# Patient Record
(demographics unavailable — no encounter records)

---

## 2025-05-21 NOTE — PLAN
[FreeTextEntry1] : 71yo w PMH of HTN, HLD, heart failure and breast cancer s/p b/l masectomy and reconstruction here for thickened endometrial lining on sono w fibroid vs polyp. #HCM - pap/HPV was obtained today, last pap smear was 11 years ago - cw HCM per PCP #thickened endometrial lining and fibroid v polyp We discussed normal endometrial lining in postmenopausal women w PMB is 4mm or less, in patients with incidentally found thickened endometrial lining, the management is less clear. Patient has been having annual US, and the thickened EM was a new finding, has had hx of polyps in prior US. Given em is quite thick and that this was different from the previous year sono, and given there is question of polyp vs submucosal fibroid, recommend endometrial sampling. We discussed embx vs D&C hysteroscopy and the differences. In patients with focal findings, best sampling is done w D&C hysteroscopy in the OR. However, given pt's heart failure status, unsure if she can obtain clearance from PCP/cards. Pt states in March needed cataract surgery, and her cards did not clear her for general anesthesia so did procedure awake. - recommend that patient discuss w cardiology and see if they feel she is better optimized now for GA - if GA contraindicated for this patient, will proceed w office endometrial biopsy, as patient was able to tolerate pelvic exam well today  Patient given referral for preop-clearance - will discuss w PCP/cards and then return to see me for office EmBx vs surgical booking.

## 2025-05-21 NOTE — PHYSICAL EXAM
[MA] : MA [Appropriately responsive] : appropriately responsive [Alert] : alert [No Acute Distress] : no acute distress [Soft] : soft [Non-tender] : non-tender [Non-distended] : non-distended [No HSM] : No HSM [No Lesions] : no lesions [No Mass] : no mass [Oriented x3] : oriented x3 [Labia Majora] : normal [Labia Minora] : normal [Atrophy] : atrophy [No Bleeding] : There was no active vaginal bleeding [Normal] : normal [Uterine Adnexae] : non-palpable [FreeTextEntry2] : Fiordaliza [Tenderness] : nontender [Enlarged ___ wks] : not enlarged [Mass ___ cm] : no uterine mass was palpated

## 2025-05-21 NOTE — HISTORY OF PRESENT ILLNESS
[Post-Menopause, No Sxs] : post-menopausal, currently without symptoms [Patient reported PAP Smear was normal] : Patient reported PAP Smear was normal [Patient reported bone density results were normal] : Patient reported bone density results were normal [Patient reported colonoscopy was normal] : Patient reported colonoscopy was normal [FreeTextEntry1] : 71yo female referred by Dr. More for abnormal pelvic ultrasound.  PMH significant for breast cancer, had bilateral mastectomy with R side LN dissection and reconstruction in 2014, on anastrazole.  LMP was 2011, then had an episode of bleeding in 2014, saw GYN and had pap smear, was given referral for mammo where they found cancer.  Denies any abnormal vaginal bleeding since then, denies any vaginal or urinary complaints today.  OBHx: VDx2, SABx1 GynHx: menarche age 10, [id number], no issues pap all reportedly normal, last was in 2014 denies STI hx not sexually active PMH: heart failure, on entresto, jardiace 2023 - LBBB, no pacemaker, last saw her cardiology March HLD, HTN PSH: b/l masectomy 2014, LN dissection on R side HER and HR2 positive hysteroscopic mymectomy FmHx: no fmhx of cancers Social: denies T/E/D lives w  had Flu in Deember, since then has been feeling very tired Allergies: NKDA Meds:  OBHx: VDx2, SABx1 PSH: myomectomy 2011, b/l masectomy w reconstructive surgery  TAUS ([facility]) 3/14/25 Uterus: 8.8cm x 3.9cm x 4.1cm Endometrial thickness: 9mm R ovary: 2.2cm x 1.8cm by 1.0cm Description: Uterus w 6mm echogenic focus associated with endometrial stripe, which may represent submucosal fibroid or endometrial polyp. 2.6cm partially calcified fibroid of the uterine body. Non visualization of the left ovary. Unremarkable right ovary. [Mammogramdate] : 2014 [PapSmeardate] : 2014 [BoneDensityDate] : 3/2024 [ColonoscopyDate] : 2006 [TextBox_43] : was told to come back in 6 years, planning to schedule